# Patient Record
Sex: MALE | Race: WHITE | URBAN - METROPOLITAN AREA
[De-identification: names, ages, dates, MRNs, and addresses within clinical notes are randomized per-mention and may not be internally consistent; named-entity substitution may affect disease eponyms.]

---

## 2017-04-12 ENCOUNTER — HOSPITAL ENCOUNTER (EMERGENCY)
Facility: CLINIC | Age: 37
Discharge: HOME | End: 2017-04-12
Attending: EMERGENCY MEDICINE | Admitting: INTERNAL MEDICINE
Payer: COMMERCIAL

## 2017-04-12 VITALS
RESPIRATION RATE: 16 BRPM | SYSTOLIC BLOOD PRESSURE: 130 MMHG | WEIGHT: 205 LBS | BODY MASS INDEX: 30.36 KG/M2 | TEMPERATURE: 98.8 F | HEART RATE: 87 BPM | OXYGEN SATURATION: 97 % | DIASTOLIC BLOOD PRESSURE: 87 MMHG | HEIGHT: 69 IN

## 2017-04-12 DIAGNOSIS — K22.2 ESOPHAGEAL OBSTRUCTION: ICD-10-CM

## 2017-04-12 PROCEDURE — 25000128 H RX IP 250 OP 636: Performed by: EMERGENCY MEDICINE

## 2017-04-12 PROCEDURE — 25000125 ZZHC RX 250: Performed by: EMERGENCY MEDICINE

## 2017-04-12 PROCEDURE — 25000128 H RX IP 250 OP 636

## 2017-04-12 PROCEDURE — 25000125 ZZHC RX 250: Performed by: INTERNAL MEDICINE

## 2017-04-12 PROCEDURE — 99285 EMERGENCY DEPT VISIT HI MDM: CPT | Mod: 25

## 2017-04-12 PROCEDURE — 96361 HYDRATE IV INFUSION ADD-ON: CPT

## 2017-04-12 PROCEDURE — 96375 TX/PRO/DX INJ NEW DRUG ADDON: CPT

## 2017-04-12 PROCEDURE — 43247 EGD REMOVE FOREIGN BODY: CPT | Performed by: INTERNAL MEDICINE

## 2017-04-12 PROCEDURE — 93005 ELECTROCARDIOGRAM TRACING: CPT | Mod: XU

## 2017-04-12 PROCEDURE — 96374 THER/PROPH/DIAG INJ IV PUSH: CPT

## 2017-04-12 PROCEDURE — 25000125 ZZHC RX 250

## 2017-04-12 RX ORDER — FENTANYL CITRATE 50 UG/ML
50 INJECTION, SOLUTION INTRAMUSCULAR; INTRAVENOUS
Status: COMPLETED | OUTPATIENT
Start: 2017-04-12 | End: 2017-04-12

## 2017-04-12 RX ORDER — NALOXONE HYDROCHLORIDE 1 MG/ML
INJECTION INTRAMUSCULAR; INTRAVENOUS; SUBCUTANEOUS
Status: DISCONTINUED
Start: 2017-04-12 | End: 2017-04-12 | Stop reason: WASHOUT

## 2017-04-12 RX ORDER — FLUMAZENIL 0.1 MG/ML
INJECTION, SOLUTION INTRAVENOUS
Status: DISCONTINUED
Start: 2017-04-12 | End: 2017-04-12 | Stop reason: HOSPADM

## 2017-04-12 RX ORDER — FLUMAZENIL 0.1 MG/ML
0.2 INJECTION, SOLUTION INTRAVENOUS
Status: DISCONTINUED | OUTPATIENT
Start: 2017-04-12 | End: 2017-04-12 | Stop reason: HOSPADM

## 2017-04-12 RX ORDER — FENTANYL CITRATE 50 UG/ML
INJECTION, SOLUTION INTRAMUSCULAR; INTRAVENOUS
Status: COMPLETED
Start: 2017-04-12 | End: 2017-04-12

## 2017-04-12 RX ORDER — NALOXONE HYDROCHLORIDE 0.4 MG/ML
.1-.4 INJECTION, SOLUTION INTRAMUSCULAR; INTRAVENOUS; SUBCUTANEOUS
Status: DISCONTINUED | OUTPATIENT
Start: 2017-04-12 | End: 2017-04-12 | Stop reason: HOSPADM

## 2017-04-12 RX ORDER — LIDOCAINE 40 MG/G
CREAM TOPICAL
Status: CANCELLED | OUTPATIENT
Start: 2017-04-12

## 2017-04-12 RX ORDER — FENTANYL CITRATE 50 UG/ML
25-50 INJECTION, SOLUTION INTRAMUSCULAR; INTRAVENOUS EVERY 5 MIN PRN
Status: DISCONTINUED | OUTPATIENT
Start: 2017-04-12 | End: 2017-04-12 | Stop reason: HOSPADM

## 2017-04-12 RX ORDER — ONDANSETRON 4 MG/1
4 TABLET, ORALLY DISINTEGRATING ORAL ONCE
Status: COMPLETED | OUTPATIENT
Start: 2017-04-12 | End: 2017-04-12

## 2017-04-12 RX ADMIN — FENTANYL CITRATE 50 MCG: 50 INJECTION, SOLUTION INTRAMUSCULAR; INTRAVENOUS at 18:00

## 2017-04-12 RX ADMIN — FENTANYL CITRATE 50 MCG: 50 INJECTION, SOLUTION INTRAMUSCULAR; INTRAVENOUS at 18:02

## 2017-04-12 RX ADMIN — ONDANSETRON 4 MG: 4 TABLET, ORALLY DISINTEGRATING ORAL at 15:32

## 2017-04-12 RX ADMIN — SODIUM CHLORIDE 1000 ML: 9 INJECTION, SOLUTION INTRAVENOUS at 17:40

## 2017-04-12 RX ADMIN — MIDAZOLAM HYDROCHLORIDE 1 MG: 1 INJECTION, SOLUTION INTRAMUSCULAR; INTRAVENOUS at 18:00

## 2017-04-12 ASSESSMENT — ENCOUNTER SYMPTOMS
VOMITING: 1
CHILLS: 0
ABDOMINAL PAIN: 1
FEVER: 0
LIGHT-HEADEDNESS: 0
TROUBLE SWALLOWING: 1
SHORTNESS OF BREATH: 0

## 2017-04-12 NOTE — ED PROVIDER NOTES
"  History     Chief Complaint:  Epigastric Discomfort      HPI   Dave Ren is a 37 year old male with a history of GERD and hyperlipidemia who presents to the emergency department for evaluation of epigastric discomfort. Of note, the patient a history of GERD for which that he takes Zantac, though notes that he stopped taking this 4 days ago as he was feeling well.  The patient states that he began to have some nausea and upper epigastric discomfort last night around 2100 after eating pulled pork for dinner, which has been present since onset and feels similar to the discomfort that he typically has with GERD. He notes that this discomfort is exacerbated by eating and notes that he is unable to swallow fluids today as a result. He additionally states that he has had nonbloody vomiting. This epigastric discomfort and inability to swallow was concerning to him and prompted him to seek evaluation here in the emergency department. . He denies any recent lightheadedness, other abdominal pain, shortness of breath, fevers, or chills.     Allergies:  NKDA    Medications:    Simvastatin  Zantac    Past Medical History:    GERD  hyperlipidemia    Past Surgical History:    The patient does not have any pertinent past surgical history.    Family History:    Melanoma  coronary artery disease    Social History:  Presents alone.  Negative for tobacco use.  Positive for alcohol use, occasionally.   Marital Status:      Review of Systems   Constitutional: Negative for chills and fever.   HENT: Positive for trouble swallowing.    Respiratory: Negative for shortness of breath.    Gastrointestinal: Positive for abdominal pain (Epigastric ) and vomiting.   Neurological: Negative for light-headedness.   All other systems reviewed and are negative.    Physical Exam   First Vitals:  BP: 121/71  Heart Rate: 111  Temp: 98.8  F (37.1  C)  Resp: 16  Height: 175.3 cm (5' 9\")  Weight: 93 kg (205 lb)  SpO2: 96 %      Physical Exam  SKIN:  " Warm, dry.  HEMATOLOGIC/IMMUNOLOGIC/LYMPHATIC:  No pallor.  HENT:  Moist oral mucosa.  Normal phonation.  No stridor.  EYES:  Conjunctivae normal.  CARDIOVASCULAR:  Regular rate and rhythm.  RESPIRATORY:  No respiratory distress, breath sounds equal and normal.  GASTROINTESTINAL:  Soft, nontender abdomen with active bowel sounds.  NEUROLOGIC:  Alert, conversant.  PSYCHIATRIC:  Normal mood.    Emergency Department Course   ECG:  Indication: Epigastric Discomfort  Time: 1537  Vent. Rate 111 bpm. SD interval 140. QRS duration 84. QT/QTc 328/446. P-R-T axis 57 23 61. Sinus tachycardia. Otherwise normal ECG. Read time: 1540    Interventions:  1532 Zofran-ODT 4mg disintegrating tablet PO  1740 NS 1L IV  1800 Fentanyl 50 mcg IV  1802 Fentanyl 50 mcg IV  1803 Versed 1 mg IV    Emergency Department Course:  Nursing notes and vitals reviewed. I performed an exam of the patient as documented above.     EKG obtained in the ED, see results above.     1700 I consulted with Dr. Taveras, Gastroenterology, regarding the patient's history and presentation here in the emergency department.    Patient was sent to GI for further workup, see GI note for details.     1820 I rechecked the patient following his procedure and again spoke with Dr. Taveras.    1840 I reevaluated the patient and provided an update in regards to his ED course.      Findings and plan explained to the Patient. Patient discharged home with instructions regarding supportive care, medications, and reasons to return. The importance of close follow-up was reviewed. The patient was prescribed Prilosec.     Impression & Plan    Medical Decision Making:  This patient suffered an esophageal obstruction, which was cleared by the consulted GI during an endoscopy. He noted findings of what appears to be eosinophilic esophagitis. He requested that we start the patient on a proton pump inhibitor. Follow up with gastroenterology recommended in the further for further treatment.      Diagnosis:  1. Esophageal obstruction (K22.2)    Disposition:  discharged to home    Discharge Medications:  New Prescriptions    OMEPRAZOLE (PRILOSEC) 20 MG CR CAPSULE    Take 1 capsule (20 mg) by mouth daily     Mily MANZO, am serving as a scribe on 4/12/2017 at 4:14 PM to personally document services performed by Sloan Henderson MD based on my observations and the provider's statements to me.     Mily Haney  4/12/2017    EMERGENCY DEPARTMENT       Sloan Henderson MD  04/13/17 1027

## 2017-04-12 NOTE — ED NOTES
"Pt given two doses of EZ gas. After second dose, pt feels like he has \"a little more room\" but vomits up everything he drinks down very quickly. Dr. Henderson notified and attempted plain water. Pt able to take one sip of water and again stated he felt like he had more room for it but after second sip, vomited water back up.  "

## 2017-04-12 NOTE — ED AVS SNAPSHOT
Emergency Department    [address] [facility] [address]    Phone:  [phone]    Fax:  [phone]                                       Dave Ren   MRN: [id number]    Department:   Emergency Department   Date of Visit:  4/12/2017           Patient Information     Date Of Birth          1980        Your diagnoses for this visit were:     Esophageal obstruction        You were seen by Sloan Henderson MD.      Follow-up Information     Follow up with Quincy Taveras MD.    Specialty:  Gastroenterology    Why:  start omeprazole - follow up with [facility] as recommended    Contact information:    [facility]  [address] [place] [address] [address] [place] [address] [phone]          Discharge Instructions         Esophageal Blockage, Resolved  The esophagus is the passage that carries food from the mouth to the stomach. You had a blockage in the esophagus. This can happen after swallowing a large piece of food, taking a large pill, or swallowing foreign objects.  If this is a recurring problem, it can be a sign of disease in the esophagus, such as inflammation (swelling and irritation) or scarring. If you did not have a special procedure (endoscopy) today to treat your condition, further testing will be needed to evaluate this problem.  The blockage has cleared. You should be able to swallow normally again.  Home care    For the next 24 hours you may drink liquids and eat soft foods.    You may have been given medicine today to prevent pain and help you relax. If so, you may feel drowsy for the next 4 to 12 hours. Do not drive or operate dangerous equipment until you feel alert again.    If your esophagus was blocked by food, be sure to cut solid food into small pieces before putting it into your mouth. Chew all foods well before swallowing.    If your esophagus was blocked by an over-the-counter pill (such as a vitamin), avoid this size pill in the future. If it was blocked by  a prescription medicine, ask your health care provider for another form of medicine.  Follow-up care  Follow up with your health care provider, or as advised. If you continue to have problems, contact your doctor or this facility for advice. If this is a recurring problem, talk to your health care provider about it. He or she may suggest having an endoscopy. This is a look in the esophagus with a small camera and light in a narrow, flexible tube).  When to seek medical advice  Call your health care provider right away if any of these occur:    Chest pain or shortness of breath    Unable to swallow    Significant pain on swallowing    Vomiting blood (red or black)    Blood in your stool (dark red or black color)    Fever of 100.4 F (38 C) or higher, or as directed by your health care provider    7679-8029 [employer]. [address] [place] [address]. All rights reserved. This information is not intended as a substitute for professional medical care. Always follow your healthcare professional's instructions.          24 Hour Appointment Hotline       To make an appointment at any [facility], call [phone] ([phone]). If you don't have a family doctor or clinic, we will help you find one. [facility] clinics are conveniently located to serve the needs of you and your family.             Review of your medicines      START taking        Dose / Directions Last dose taken    omeprazole 20 MG CR capsule   Commonly known as:  priLOSEC   Dose:  20 mg   Quantity:  30 capsule        Take 1 capsule (20 mg) by mouth daily   Refills:  1          Our records show that you are taking the medicines listed below. If these are incorrect, please call your family doctor or clinic.        Dose / Directions Last dose taken    SIMVASTATIN PO        Refills:  0                Prescriptions were sent or printed at these locations (1 Prescription)                   [id number] - [place] -  [address] [place] [address]   [address] [place] [address]    Telephone:  [phone]   Fax:  [phone]   Hours:                  E-Prescribed (1 of 1)         omeprazole (PRILOSEC) 20 MG CR capsule                Procedures and tests performed during your visit     EKG 12 lead      Orders Needing Specimen Collection     None      Pending Results     No orders found from 4/10/2017 to 4/13/2017.            Pending Culture Results     No orders found from 4/10/2017 to 4/13/2017.            Test Results From Your Hospital Stay               Clinical Quality Measure: Blood Pressure Screening     Your blood pressure was checked while you were in the emergency department today. The last reading we obtained was  BP: 130/87 . Please read the guidelines below about what these numbers mean and what you should do about them.  If your systolic blood pressure (the top number) is less than 120 and your diastolic blood pressure (the bottom number) is less than 80, then your blood pressure is normal. There is nothing more that you need to do about it.  If your systolic blood pressure (the top number) is 120-139 or your diastolic blood pressure (the bottom number) is 80-89, your blood pressure may be higher than it should be. You should have your blood pressure rechecked within a year by a primary care provider.  If your systolic blood pressure (the top number) is 140 or greater or your diastolic blood pressure (the bottom number) is 90 or greater, you may have high blood pressure. High blood pressure is treatable, but if left untreated over time it can put you at risk for heart attack, stroke, or kidney failure. You should have your blood pressure rechecked by a primary care provider within the next 4 weeks.  If your provider in the emergency department today gave you specific instructions to follow-up with your doctor or provider even sooner than that, you should follow that instruction and not wait for  up to 4 weeks for your follow-up visit.        Thank you for choosing [facility]       Thank you for choosing [facility] for your care. Our goal is always to provide you with excellent care. Hearing back from our patients is one way we can continue to improve our services. Please take a few minutes to complete the written survey that you may receive in the mail after you visit with us. Thank you!        [employer] Information     [id number] gives you secure access to your electronic health record. If you see a primary care provider, you can also send messages to your care team and make appointments. If you have questions, please call your primary care clinic.  If you do not have a primary care provider, please call [phone] and they will assist you.        Care EveryWhere ID     This is your Care EveryWhere ID. This could be used by other organizations to access your [facility] medical records  [id number]        After Visit Summary       This is your record. Keep this with you and show to your community pharmacist(s) and doctor(s) at your next visit.

## 2017-04-12 NOTE — ED AVS SNAPSHOT
Emergency Department    [address] [place] [address]    Phone:  [phone]    Fax:  [phone]                                       Dave Ren   MRN: [id number]    Department:   Emergency Department   Date of Visit:  4/12/2017           After Visit Summary Signature Page     I have received my discharge instructions, and my questions have been answered. I have discussed any challenges I see with this plan with the nurse or doctor.    ..........................................................................................................................................  Patient/Patient Representative Signature      ..........................................................................................................................................  Patient Representative Print Name and Relationship to Patient    ..................................................               ................................................  Date                                            Time    ..........................................................................................................................................  Reviewed by Signature/Title    ...................................................              ..............................................  Date                                                            Time

## 2017-04-12 NOTE — CONSULTS
[facility]  Gastroenterology Consultation         Dave Ren  [address] [place] [address]  37 year old male    Admission Date/Time: 4/12/2017  Primary Care Provider: Helder Infante  Referring / Attending Physician:  Food Impaction    We were asked to see the patient in consultation by Dr. Henderson for evaluation of Acute dysphagia.      CC: Acute dysphagia, food impection    HPI:  Dave Ren is a 37 year  old male with a history of GERD and hyperlipidemia who presents to the ER for epigastric discomfort an ability to swallow saliva. PMhx of GERD for which that he takes Zantac, though notes that he stopped taking this 4 days ago. Patient had  Feeling of food stuck after eating pulled pork for dinner last night at 8 or so PM , which has been present since onset and feels similar to the discomfort that he typically has with GERD. He notes that this discomfort is exacerbated by eating and notes that he is unable to swallow fluids today as a result. He additionally states that he has had nonbloody vomiting. This epigastric discomfort and inability to swallow was concerning to him and prompted him to seek evaluation here in the emergency department. . He denies any recent lightheadedness, other abdominal pain, shortness of breath, fevers, or chills.    No other major health problems.    ROS: A comprehensive ten point review of systems was negative aside from those in mentioned in the HPI.      PAST MED HX:  I have reviewed this patient's medical history and updated it with pertinent information if needed.   Past Medical History:   Diagnosis Date     Allergic state      Dyslipidemia 12/17/2006    High total cholesterol, low HDL; Cardiac Risk Factors: none; goal LDL < 160; Wilson 10-year Cardiac Risk Score of <1%      Gastroesophageal reflux disease        MEDICATIONS:   Prior to Admission Medications   Prescriptions Last Dose Informant Patient Reported? Taking?   SIMVASTATIN  PO   Yes Yes      Facility-Administered Medications: None       ALLERGIES: No Known Allergies    SOCIAL HISTORY:  Social History   Substance Use Topics     Smoking status: Never Smoker     Smokeless tobacco: Not on file     Alcohol use Yes      Comment: Occ       FAMILY HISTORY:  Family History   Problem Relation Age of Onset     CANCER Paternal Grandfather      Melanoma     C.A.D. Maternal Grandfather      onset over age 65 (lived to age 96)     DIABETES No family hx of      Cancer - colorectal No family hx of      Prostate Cancer No family hx of        PHYSICAL EXAM:   General  Awake alert, fairly uncomfortable.  Vital Signs with Ranges  Temp: 98.8  F (37.1  C) Temp src: Oral BP: 114/81 Pulse: 102 Heart Rate: 111 Resp: 20 SpO2: 95 %           Constitutional: healthy, alert and no distress   Cardiovascular: negative, PMI normal. No lifts, heaves, or thrills. RRR. No murmurs, clicks gallops or rub  Respiratory: negative, Percussion normal. Good diaphragmatic excursion. Lungs clear  Head: Normocephalic. No masses, lesions, tenderness or abnormalities  Neck: Neck supple. No adenopathy. Thyroid symmetric, normal size,, Carotids without bruits.  Abdomen: Abdomen soft, non-tender. BS normal. No masses, organomegaly  SKIN: no suspicious lesions or rashes            CONSULTATION ASSESSMENT AND PLAN:    Active Problems:   Acute dysphagia with food impaction. Likely esophageal stricture due to GERD. Patient is not able to swallow even saliva.  Plan for Emergent EGD and disimpaction.  Start on PPI after.  Possible D/C home after EGD and F/U EGD as out patient on later date.        Thank you very much for letting us participate in his care.    Quincy Taveras MD, FACP  [facility].  Office: [phone]  Cell : [phone]

## 2017-04-12 NOTE — DISCHARGE INSTRUCTIONS
Esophageal Blockage, Resolved  The esophagus is the passage that carries food from the mouth to the stomach. You had a blockage in the esophagus. This can happen after swallowing a large piece of food, taking a large pill, or swallowing foreign objects.  If this is a recurring problem, it can be a sign of disease in the esophagus, such as inflammation (swelling and irritation) or scarring. If you did not have a special procedure (endoscopy) today to treat your condition, further testing will be needed to evaluate this problem.  The blockage has cleared. You should be able to swallow normally again.  Home care    For the next 24 hours you may drink liquids and eat soft foods.    You may have been given medicine today to prevent pain and help you relax. If so, you may feel drowsy for the next 4 to 12 hours. Do not drive or operate dangerous equipment until you feel alert again.    If your esophagus was blocked by food, be sure to cut solid food into small pieces before putting it into your mouth. Chew all foods well before swallowing.    If your esophagus was blocked by an over-the-counter pill (such as a vitamin), avoid this size pill in the future. If it was blocked by a prescription medicine, ask your health care provider for another form of medicine.  Follow-up care  Follow up with your health care provider, or as advised. If you continue to have problems, contact your doctor or this facility for advice. If this is a recurring problem, talk to your health care provider about it. He or she may suggest having an endoscopy. This is a look in the esophagus with a small camera and light in a narrow, flexible tube).  When to seek medical advice  Call your health care provider right away if any of these occur:    Chest pain or shortness of breath    Unable to swallow    Significant pain on swallowing    Vomiting blood (red or black)    Blood in your stool (dark red or black color)    Fever of 100.4 F (38 C) or higher,  or as directed by your health care provider    [id number] [employer]. [address] [place] [address], [place] [address]. All rights reserved. This information is not intended as a substitute for professional medical care. Always follow your healthcare professional's instructions.

## 2017-04-13 LAB — UPPER GI ENDOSCOPY: NORMAL

## 2019-11-07 ENCOUNTER — HEALTH MAINTENANCE LETTER (OUTPATIENT)
Age: 39
End: 2019-11-07

## 2020-12-06 ENCOUNTER — HEALTH MAINTENANCE LETTER (OUTPATIENT)
Age: 40
End: 2020-12-06

## 2021-09-25 ENCOUNTER — HEALTH MAINTENANCE LETTER (OUTPATIENT)
Age: 41
End: 2021-09-25

## 2022-01-15 ENCOUNTER — HEALTH MAINTENANCE LETTER (OUTPATIENT)
Age: 42
End: 2022-01-15

## 2023-01-07 ENCOUNTER — HEALTH MAINTENANCE LETTER (OUTPATIENT)
Age: 43
End: 2023-01-07

## 2023-04-22 ENCOUNTER — HEALTH MAINTENANCE LETTER (OUTPATIENT)
Age: 43
End: 2023-04-22